# Patient Record
(demographics unavailable — no encounter records)

---

## 2025-06-06 NOTE — ASSESSMENT
[FreeTextEntry1] :  28 y/o F, w/ no significant PMHx who presented to [facility] ED on 6/1/25 c/o neck/upper chest pain that began on the night of 5/31/25.   Shortly after eating, she started to experience worsening lower neck/upper chest pain. On the morning of 6/1 the symptoms became associated with SOB, prompting her to go to ED. CT head and neck and CT chest revealed pneumomediastinum. Patient admitted to thoracic surgery service for observation.   Imaging as follows:  CT Neck soft tissue 06/01/2025: Soft tissue emphysema in the lower neck is contiguous with pneumomediastinum. Recommend further imaging evaluation include CT Chest.    CT Chest 06/01/2025:  Pneumomediastinum. No esophageal dilatation or radiopaque foreign body. No pneumothorax.    She presents today for a follow-up visit with CXR.   CXR 6/6/25: clear; no evidence of ptx or pneumomediastinum  She reports feeling well. Denies chest pain or SOB. Imaging of inpatient CXR and CXR obtained today reviewed with patient during the time of visit and shows no evidence of pneumomediastinum. She is cleared from a thoracic standpoint to travel. She was advised that should she experience acute SOB or chest discomfort to seek evaluation otherwise she can follow-up with us as needed. Patient verbalized understanding.   Plan: MARGRET KIDD, Dr. Jaylen Perdue MD, personally performed the evaluation and management (E/M) services for this established patient who presents today with (a) new problem(s)/exacerbation of (an) existing condition(s).  That E/M includes conducting the clinically appropriate interval history &/or exam, assessing all new/exacerbated conditions, and establishing a new plan of care. I have also independently reviewed the medical records and imaging at the time of this office visit, and discussed the above mentioned images with interpretations with the patient. Today, my SHARIF was here to observe &/or participate in the visit & follow plan of care established by me.    Total time of 20 minutes with > 50% spent with the patient discussing radiologic studies, diagnosis, treatment options, and risks and benefits of surgery.

## 2025-06-06 NOTE — HISTORY OF PRESENT ILLNESS
[FreeTextEntry1] : Ms. Koch is a 28 y/o F, w/ no significant PMHx who presented to [facility] ED on 6/1/25 c/o neck/upper chest pain that began on the night of 5/31/25.  Patient states that she ate branzino for dinner, but does not recall ingesting any bones. Shortly after eating, she started to experience worsening lower neck/upper chest pain. On the morning of 6/1 the symptoms became associated with SOB, prompting her to go to ED. CT head and neck and CT chest revealed pneumomediastinum. Patient admitted to thoracic surgery service for observation.   Imaging as follows:  CT Neck soft tissue 06/01/2025: Soft tissue emphysema in the lower neck is contiguous with pneumomediastinum. Recommend further imaging evaluation include CT Chest.    CT Chest 06/01/2025:  Pneumomediastinum. No esophageal dilatation or radiopaque foreign body. No pneumothorax.    She presents today for a follow-up visit with CXR.   CXR 6/6/25: clear; no evidence of ptx or pneumomediastinum  She reports feeling well. Denies chest pain or SOB.

## 2025-06-06 NOTE — HISTORY OF PRESENT ILLNESS
[FreeTextEntry1] : Ms. Koch is a 26 y/o F, w/ no significant PMHx who presented to [facility] ED on 6/1/25 c/o neck/upper chest pain that began on the night of 5/31/25.  Patient states that she ate branzino for dinner, but does not recall ingesting any bones. Shortly after eating, she started to experience worsening lower neck/upper chest pain. On the morning of 6/1 the symptoms became associated with SOB, prompting her to go to ED. CT head and neck and CT chest revealed pneumomediastinum. Patient admitted to thoracic surgery service for observation.   Imaging as follows:  CT Neck soft tissue 06/01/2025: Soft tissue emphysema in the lower neck is contiguous with pneumomediastinum. Recommend further imaging evaluation include CT Chest.    CT Chest 06/01/2025:  Pneumomediastinum. No esophageal dilatation or radiopaque foreign body. No pneumothorax.    She presents today for a follow-up visit with CXR.   CXR 6/6/25: clear; no evidence of ptx or pneumomediastinum  She reports feeling well. Denies chest pain or SOB.

## 2025-06-06 NOTE — ASSESSMENT
[FreeTextEntry1] :  28 y/o F, w/ no significant PMHx who presented to [facility] ED on 6/1/25 c/o neck/upper chest pain that began on the night of 5/31/25.   Shortly after eating, she started to experience worsening lower neck/upper chest pain. On the morning of 6/1 the symptoms became associated with SOB, prompting her to go to ED. CT head and neck and CT chest revealed pneumomediastinum. Patient admitted to thoracic surgery service for observation.   Imaging as follows:  CT Neck soft tissue 06/01/2025: Soft tissue emphysema in the lower neck is contiguous with pneumomediastinum. Recommend further imaging evaluation include CT Chest.    CT Chest 06/01/2025:  Pneumomediastinum. No esophageal dilatation or radiopaque foreign body. No pneumothorax.    She presents today for a follow-up visit with CXR.   CXR 6/6/25: clear; no evidence of ptx or pneumomediastinum  She reports feeling well. Denies chest pain or SOB. Imaging of inpatient CXR and CXR obtained today reviewed with patient during the time of visit and shows no evidence of pneumomediastinum. She is cleared from a thoracic standpoint to travel. She was advised that should she experience acute SOB or chest discomfort to seek evaluation otherwise she can follow-up with us as needed. Patient verbalized understanding.   Plan: MARGRET KIDD, Dr. Jaylen Perdue MD, personally performed the evaluation and management (E/M) services for this established patient who presents today with (a) new problem(s)/exacerbation of (an) existing condition(s).  That E/M includes conducting the clinically appropriate interval history &/or exam, assessing all new/exacerbated conditions, and establishing a new plan of care. I have also independently reviewed the medical records and imaging at the time of this office visit, and discussed the above mentioned images with interpretations with the patient. Today, my SHARIF was here to observe &/or participate in the visit & follow plan of care established by me.    Total time of 20 minutes with > 50% spent with the patient discussing radiologic studies, diagnosis, treatment options, and risks and benefits of surgery.

## 2025-06-06 NOTE — PHYSICAL EXAM
[Fully active, able to carry on all pre-disease performance without restriction] : Status 0 - Fully active, able to carry on all pre-disease performance without restriction [Neck Appearance] : the appearance of the neck was normal [] : no respiratory distress [Respiration, Rhythm And Depth] : normal respiratory rhythm and effort [Heart Rate And Rhythm] : heart rate was normal and rhythm regular [Examination Of The Chest] : the chest was normal in appearance [Chest Visual Inspection Thoracic Asymmetry] : no chest asymmetry [Sensation] : the sensory exam was normal to light touch and pinprick [Motor Exam] : the motor exam was normal [Oriented To Time, Place, And Person] : oriented to person, place, and time [Affect] : the affect was normal [Mood] : the mood was normal